# Patient Record
Sex: FEMALE | Race: ASIAN | NOT HISPANIC OR LATINO
[De-identification: names, ages, dates, MRNs, and addresses within clinical notes are randomized per-mention and may not be internally consistent; named-entity substitution may affect disease eponyms.]

---

## 2018-02-01 ENCOUNTER — APPOINTMENT (OUTPATIENT)
Dept: SURGERY | Facility: CLINIC | Age: 43
End: 2018-02-01
Payer: COMMERCIAL

## 2018-02-01 VITALS
HEIGHT: 65 IN | DIASTOLIC BLOOD PRESSURE: 84 MMHG | WEIGHT: 200 LBS | BODY MASS INDEX: 33.32 KG/M2 | TEMPERATURE: 98.6 F | SYSTOLIC BLOOD PRESSURE: 127 MMHG | HEART RATE: 78 BPM

## 2018-02-01 PROCEDURE — 99203 OFFICE O/P NEW LOW 30 MIN: CPT

## 2018-03-01 ENCOUNTER — APPOINTMENT (OUTPATIENT)
Dept: SURGERY | Facility: CLINIC | Age: 43
End: 2018-03-01
Payer: COMMERCIAL

## 2018-03-01 VITALS
BODY MASS INDEX: 34.16 KG/M2 | WEIGHT: 205 LBS | TEMPERATURE: 98.7 F | HEART RATE: 75 BPM | DIASTOLIC BLOOD PRESSURE: 84 MMHG | SYSTOLIC BLOOD PRESSURE: 128 MMHG | HEIGHT: 65 IN

## 2018-03-01 PROCEDURE — 99213 OFFICE O/P EST LOW 20 MIN: CPT

## 2018-03-21 ENCOUNTER — OUTPATIENT (OUTPATIENT)
Dept: OUTPATIENT SERVICES | Facility: HOSPITAL | Age: 43
LOS: 1 days | End: 2018-03-21
Payer: COMMERCIAL

## 2018-03-21 VITALS
OXYGEN SATURATION: 98 % | WEIGHT: 199.96 LBS | RESPIRATION RATE: 18 BRPM | TEMPERATURE: 99 F | DIASTOLIC BLOOD PRESSURE: 80 MMHG | HEIGHT: 65 IN | HEART RATE: 72 BPM | SYSTOLIC BLOOD PRESSURE: 130 MMHG

## 2018-03-21 DIAGNOSIS — Z98.890 OTHER SPECIFIED POSTPROCEDURAL STATES: Chronic | ICD-10-CM

## 2018-03-21 DIAGNOSIS — N63.0 UNSPECIFIED LUMP IN UNSPECIFIED BREAST: ICD-10-CM

## 2018-03-21 DIAGNOSIS — Z01.818 ENCOUNTER FOR OTHER PREPROCEDURAL EXAMINATION: ICD-10-CM

## 2018-03-21 PROCEDURE — G0463: CPT

## 2018-03-21 PROCEDURE — 19125 EXCISION BREAST LESION: CPT | Mod: LT

## 2018-03-21 RX ORDER — SODIUM CHLORIDE 9 MG/ML
3 INJECTION INTRAMUSCULAR; INTRAVENOUS; SUBCUTANEOUS EVERY 8 HOURS
Refills: 0 | Status: DISCONTINUED | OUTPATIENT
Start: 2018-03-28 | End: 2018-04-05

## 2018-03-21 NOTE — H&P PST ADULT - FAMILY HISTORY
Mother  Still living? Yes, Estimated age: Age Unknown  Family history of heart disease, Age at diagnosis: Age Unknown  Family history of hypertension, Age at diagnosis: Age Unknown     Father  Still living? Yes, Estimated age: Age Unknown  Family history of hypertension, Age at diagnosis: Age Unknown  Family history of diabetes mellitus, Age at diagnosis: Age Unknown

## 2018-03-21 NOTE — H&P PST ADULT - NSANTHOSAYNRD_GEN_A_CORE
No. CORBIN screening performed.  STOP BANG Legend: 0-2 = LOW Risk; 3-4 = INTERMEDIATE Risk; 5-8 = HIGH Risk

## 2018-03-21 NOTE — H&P PST ADULT - HISTORY OF PRESENT ILLNESS
This is 41 y/o female a routine mammogram revealed left breast mass, needle biopsy + atypical cells, she presents today for surgery

## 2018-03-27 ENCOUNTER — TRANSCRIPTION ENCOUNTER (OUTPATIENT)
Age: 43
End: 2018-03-27

## 2018-03-28 ENCOUNTER — RESULT REVIEW (OUTPATIENT)
Age: 43
End: 2018-03-28

## 2018-03-28 ENCOUNTER — OUTPATIENT (OUTPATIENT)
Dept: OUTPATIENT SERVICES | Facility: HOSPITAL | Age: 43
LOS: 1 days | Discharge: HOME | End: 2018-03-28
Payer: COMMERCIAL

## 2018-03-28 VITALS
OXYGEN SATURATION: 99 % | HEART RATE: 72 BPM | SYSTOLIC BLOOD PRESSURE: 129 MMHG | TEMPERATURE: 98 F | RESPIRATION RATE: 17 BRPM | HEIGHT: 65 IN | DIASTOLIC BLOOD PRESSURE: 74 MMHG | WEIGHT: 199.96 LBS

## 2018-03-28 VITALS
DIASTOLIC BLOOD PRESSURE: 76 MMHG | HEART RATE: 67 BPM | SYSTOLIC BLOOD PRESSURE: 115 MMHG | RESPIRATION RATE: 12 BRPM | OXYGEN SATURATION: 100 % | TEMPERATURE: 98 F

## 2018-03-28 DIAGNOSIS — N63.0 UNSPECIFIED LUMP IN UNSPECIFIED BREAST: ICD-10-CM

## 2018-03-28 DIAGNOSIS — Z98.890 OTHER SPECIFIED POSTPROCEDURAL STATES: Chronic | ICD-10-CM

## 2018-03-28 DIAGNOSIS — Z01.818 ENCOUNTER FOR OTHER PREPROCEDURAL EXAMINATION: ICD-10-CM

## 2018-03-28 LAB — HCG UR QL: NEGATIVE — SIGNIFICANT CHANGE UP

## 2018-03-28 PROCEDURE — 19125 EXCISION BREAST LESION: CPT | Mod: LT

## 2018-03-28 PROCEDURE — 76098 X-RAY EXAM SURGICAL SPECIMEN: CPT

## 2018-03-28 PROCEDURE — 88305 TISSUE EXAM BY PATHOLOGIST: CPT

## 2018-03-28 PROCEDURE — 81025 URINE PREGNANCY TEST: CPT

## 2018-03-28 PROCEDURE — 76098 X-RAY EXAM SURGICAL SPECIMEN: CPT | Mod: 26

## 2018-03-28 PROCEDURE — 19281 PERQ DEVICE BREAST 1ST IMAG: CPT

## 2018-03-28 PROCEDURE — 88305 TISSUE EXAM BY PATHOLOGIST: CPT | Mod: 26

## 2018-03-28 PROCEDURE — 19281 PERQ DEVICE BREAST 1ST IMAG: CPT | Mod: LT

## 2018-03-28 RX ORDER — ACETAMINOPHEN WITH CODEINE 300MG-30MG
1 TABLET ORAL
Qty: 10 | Refills: 0
Start: 2018-03-28

## 2018-03-28 RX ORDER — ACETAMINOPHEN 500 MG
1000 TABLET ORAL ONCE
Refills: 0 | Status: DISCONTINUED | OUTPATIENT
Start: 2018-03-28 | End: 2018-03-28

## 2018-03-28 RX ORDER — ACETAMINOPHEN WITH CODEINE 300MG-30MG
1 TABLET ORAL EVERY 4 HOURS
Refills: 0 | Status: DISCONTINUED | OUTPATIENT
Start: 2018-03-28 | End: 2018-03-28

## 2018-03-28 RX ORDER — ONDANSETRON 8 MG/1
4 TABLET, FILM COATED ORAL ONCE
Refills: 0 | Status: DISCONTINUED | OUTPATIENT
Start: 2018-03-28 | End: 2018-03-28

## 2018-03-28 RX ORDER — HYDROMORPHONE HYDROCHLORIDE 2 MG/ML
0.5 INJECTION INTRAMUSCULAR; INTRAVENOUS; SUBCUTANEOUS
Refills: 0 | Status: DISCONTINUED | OUTPATIENT
Start: 2018-03-28 | End: 2018-03-28

## 2018-03-28 NOTE — ASU DISCHARGE PLAN (ADULT/PEDIATRIC). - MEDICATION SUMMARY - MEDICATIONS TO TAKE
I will START or STAY ON the medications listed below when I get home from the hospital:    acetaminophen-codeine 300 mg-30 mg oral tablet  -- 1 tab(s) by mouth every 6 hours MDD:4 tabs prn pain  -- Caution federal law prohibits the transfer of this drug to any person other  than the person for whom it was prescribed.  May cause drowsiness.  Alcohol may intensify this effect.  Use care when operating dangerous machinery.  This product contains acetaminophen.  Do not use  with any other product containing acetaminophen to prevent possible liver damage.  Using more of this medication than prescribed may cause serious breathing problems.    -- Indication: For pain

## 2018-03-28 NOTE — BRIEF OPERATIVE NOTE - PROCEDURE
<<-----Click on this checkbox to enter Procedure Excisional biopsy of breast with needle localization  03/28/2018  left  Active  JESSICA

## 2018-03-30 LAB — SURGICAL PATHOLOGY FINAL REPORT - CH: SIGNIFICANT CHANGE UP

## 2018-04-12 PROBLEM — Z87.19 HISTORY OF FATTY INFILTRATION OF LIVER: Status: RESOLVED | Noted: 2018-04-12 | Resolved: 2018-04-12

## 2018-04-12 PROBLEM — Z82.49 FAMILY HISTORY OF HYPERTENSION: Status: ACTIVE | Noted: 2018-02-01

## 2018-04-12 PROBLEM — Z88.9 HISTORY OF SEASONAL ALLERGIES: Status: RESOLVED | Noted: 2018-02-01 | Resolved: 2018-04-12

## 2018-04-12 PROBLEM — Z97.3 WEARS GLASSES: Status: RESOLVED | Noted: 2018-02-01 | Resolved: 2018-04-12

## 2018-04-12 PROBLEM — Z87.898 HISTORY OF LUMP OF LEFT BREAST: Status: RESOLVED | Noted: 2018-04-12 | Resolved: 2018-04-12

## 2018-04-19 ENCOUNTER — APPOINTMENT (OUTPATIENT)
Dept: SURGERY | Facility: CLINIC | Age: 43
End: 2018-04-19
Payer: COMMERCIAL

## 2018-04-19 DIAGNOSIS — Z87.898 PERSONAL HISTORY OF OTHER SPECIFIED CONDITIONS: ICD-10-CM

## 2018-04-19 DIAGNOSIS — Z82.49 FAMILY HISTORY OF ISCHEMIC HEART DISEASE AND OTHER DISEASES OF THE CIRCULATORY SYSTEM: ICD-10-CM

## 2018-04-19 DIAGNOSIS — Z87.19 PERSONAL HISTORY OF OTHER DISEASES OF THE DIGESTIVE SYSTEM: ICD-10-CM

## 2018-04-19 DIAGNOSIS — Z88.9 ALLERGY STATUS TO UNSPECIFIED DRUGS, MEDICAMENTS AND BIOLOGICAL SUBSTANCES: ICD-10-CM

## 2018-04-19 DIAGNOSIS — Z97.3 PRESENCE OF SPECTACLES AND CONTACT LENSES: ICD-10-CM

## 2018-04-19 DIAGNOSIS — Z82.2 FAMILY HISTORY OF DEAFNESS AND HEARING LOSS: ICD-10-CM

## 2018-04-19 DIAGNOSIS — Z87.42 PERSONAL HISTORY OF OTHER DISEASES OF THE FEMALE GENITAL TRACT: ICD-10-CM

## 2018-04-19 PROCEDURE — 99024 POSTOP FOLLOW-UP VISIT: CPT

## 2018-07-26 ENCOUNTER — APPOINTMENT (OUTPATIENT)
Dept: SURGERY | Facility: CLINIC | Age: 43
End: 2018-07-26
Payer: COMMERCIAL

## 2018-07-26 VITALS
SYSTOLIC BLOOD PRESSURE: 137 MMHG | HEIGHT: 65 IN | WEIGHT: 202 LBS | DIASTOLIC BLOOD PRESSURE: 92 MMHG | TEMPERATURE: 98.3 F | HEART RATE: 73 BPM | BODY MASS INDEX: 33.66 KG/M2

## 2018-07-26 PROCEDURE — 99213 OFFICE O/P EST LOW 20 MIN: CPT

## 2018-09-07 NOTE — H&P PST ADULT - GASTROINTESTINAL
seizure precautions/aspiration precautions/fall precautions/vision precautions negative Soft, non-tender, no hepatosplenomegaly, normal bowel sounds

## 2022-07-05 NOTE — ASU PREOP CHECKLIST - ADVANCE DIRECTIVE ADDRESSED/READDRESSED
done Sarecycline Counseling: Patient advised regarding possible photosensitivity and discoloration of the teeth, skin, lips, tongue and gums.  Patient instructed to avoid sunlight, if possible.  When exposed to sunlight, patients should wear protective clothing, sunglasses, and sunscreen.  The patient was instructed to call the office immediately if the following severe adverse effects occur:  hearing changes, easy bruising/bleeding, severe headache, or vision changes.  The patient verbalized understanding of the proper use and possible adverse effects of sarecycline.  All of the patient's questions and concerns were addressed.

## 2022-11-01 PROBLEM — N83.209 UNSPECIFIED OVARIAN CYST, UNSPECIFIED SIDE: Chronic | Status: ACTIVE | Noted: 2018-03-21

## 2022-11-01 PROBLEM — K76.0 FATTY (CHANGE OF) LIVER, NOT ELSEWHERE CLASSIFIED: Chronic | Status: ACTIVE | Noted: 2018-03-21

## 2022-11-28 ENCOUNTER — APPOINTMENT (OUTPATIENT)
Dept: SURGERY | Facility: CLINIC | Age: 47
End: 2022-11-28

## 2022-11-28 VITALS
SYSTOLIC BLOOD PRESSURE: 110 MMHG | TEMPERATURE: 96.4 F | DIASTOLIC BLOOD PRESSURE: 76 MMHG | HEIGHT: 65 IN | BODY MASS INDEX: 34.16 KG/M2 | HEART RATE: 84 BPM | WEIGHT: 205 LBS

## 2022-11-28 DIAGNOSIS — Z80.3 FAMILY HISTORY OF MALIGNANT NEOPLASM OF BREAST: ICD-10-CM

## 2022-11-28 DIAGNOSIS — Z86.79 PERSONAL HISTORY OF OTHER DISEASES OF THE CIRCULATORY SYSTEM: ICD-10-CM

## 2022-11-28 DIAGNOSIS — Z83.3 FAMILY HISTORY OF DIABETES MELLITUS: ICD-10-CM

## 2022-11-28 DIAGNOSIS — Z82.3 FAMILY HISTORY OF STROKE: ICD-10-CM

## 2022-11-28 DIAGNOSIS — N60.09 SOLITARY CYST OF UNSPECIFIED BREAST: ICD-10-CM

## 2022-11-28 PROCEDURE — 99203 OFFICE O/P NEW LOW 30 MIN: CPT

## 2022-11-28 RX ORDER — BISACODYL 5 MG/1
5 TABLET ORAL
Qty: 4 | Refills: 0 | Status: ACTIVE | COMMUNITY
Start: 2022-10-25

## 2022-11-28 RX ORDER — METHYLPREDNISOLONE 4 MG/1
4 TABLET ORAL
Qty: 21 | Refills: 0 | Status: ACTIVE | COMMUNITY
Start: 2022-08-27

## 2022-11-28 RX ORDER — LOSARTAN POTASSIUM 50 MG/1
50 TABLET, FILM COATED ORAL
Qty: 30 | Refills: 0 | Status: ACTIVE | COMMUNITY
Start: 2022-11-23

## 2022-11-28 RX ORDER — FAMOTIDINE 40 MG/1
40 TABLET, FILM COATED ORAL
Qty: 60 | Refills: 0 | Status: ACTIVE | COMMUNITY
Start: 2022-11-23

## 2022-11-28 NOTE — PLAN
[FreeTextEntry1] : Ms. ZALDIVAR  is presenting  today for an evaluation .  she is doing well and offers no complaints.  Results of  her recent  imaging and physical examination findings were discussed in details.   She  was advised to have BL             breast US and Mammogram in  October 2023 and return after the tests. Importance of monthly self-breast examination was reinforced.  Patient's questions and concerns addressed to patient's satisfaction.\par \par  n

## 2022-11-28 NOTE — CONSULT LETTER
[Dear  ___] : Dear  [unfilled], [Consult Letter:] : I had the pleasure of evaluating your patient, [unfilled]. [Please see my note below.] : Please see my note below. [Consult Closing:] : Thank you very much for allowing me to participate in the care of this patient.  If you have any questions, please do not hesitate to contact me. [Sincerely,] : Sincerely, [FreeTextEntry3] : Charles Conn MD, FACS

## 2022-11-28 NOTE — PHYSICAL EXAM
[Alert] : alert [Oriented to Person] : oriented to person [Oriented to Place] : oriented to place [Oriented to Time] : oriented to time [Calm] : calm [de-identified] : She  is alert, well-groomed, and in NAD [de-identified] : anicteric.  Nasal mucosa pink, septum midline. Oral mucosa pink.  Tongue midline, Pharynx without exudates.\par   [de-identified] : Neck supple. Trachea midline. Thyroid isthmus barely palpable, lobes not felt.\par   [de-identified] : No chest deformity. Breast are symmetric, Normal contours. No nodules, masses, tenderness, or axillary or supraclavicular  adenopathy. No nipple discharge. no skin retraction \par

## 2022-11-28 NOTE — DATA REVIEWED
[FreeTextEntry1] : Patient Name\par ROCHELLE ZALDIVAR\par Date of Birth\par 1975\par Procedure\par US SCREENING BREAST BILATERAL\par Study Date & Time\par 2022-10-18 6:31 PM\par Patient ID\par [id number]\par Accession Number\par [id number]\par Referring Physician\par FAUZIA JONES\par Institution Name\par [facility]\par Report\par RADIOLOGY REPORT\par FINDINGS\par FINDING\par Indication: 47 years old for screening. Reported history of benign left core needle biopsy in 2017.\par There is no family history of breast cancer.\par Date of last clinical exam:"One month ago".\par Comparison is made with multiple prior mammograms dating back to 12/15/2016 and prior breast\par ultrasounds dated 10/16/2021 and 10/12/2020.\par 3D digital CC and MLO views of both breasts were obtained. Reconstructed CC and MLO views were\par obtained. This study was interpreted in conjunction with a computer aided detection system.\par Findings:\par The breast tissue is heterogeneously dense, which may obscure small masses. There are two biopsy\par clips in the left breast. Bilateral scattered coarse benign-appearing calcifications are unchanged.\par No dominant masses, significant calcifications or areas of architectural distortion are identified.\par Nonenlarged bilateral axillary lymph nodes are seen.\par Bilateral breast ultrasound was performed around the clock including the retroareolar regions and\par axillae using a high resolution linear array transducer.\par Right breast:\par Scattered simple cysts, the largest of which is located at 9:00, 5 cm from the nipple and measures\par 0.8 cm.\par Left breast:\par 2:00, stable area of postsurgical scar.\par Scattered simple cysts, the largest of which is located at 2:00, 4 cm from the nipple and measures\par 1.1 cm.\par There are no enlarged axillary lymph nodes on either side.\par Impression:\par No mammographic or sonographic evidence of malignancy.\par BI-RADS CATEGORY: 2 - Benign\par Recommendation: Routine annual follow up.\par A letter will be sent to the patient with the above recommendations.\par Approximately 10% of breast carcinomas are not radiographically detectable. A negative report should\par not delay biopsy if a clinically suspicious mass is present. Dense breasts may obscure an underlying\par neoplasm.\par Patient has been added into a reminder system with a target due date for their next mammogram.\par BIRADS 1: Negative\par BIRADS 2: Benign\par BIRADS 3: Probably Benign\par BIRADS 4: Suspicious Abnormality - Biopsy should be considered.\par BIRADS 4a: Low Suspicion of Malignancy\par BIRADS 4b: Intermediate Suspicion of Malignancy\par BIRADS 4c: Moderately Suspicious of Malignancy\par BIRADS 5: Highly Suspicious of Malignancy\par BIRADS 6: Known Malignancy\par BIRADS 0: Incomplete - Need Additional Imaging Evaluation and/or Prior Mammograms for Comparison\par The [place] Department of Health requires us to indicate the date of the patient's last\par clinical breast examination.\par Electronically signed by: Shelly Garcia MD 10/19/2022 2:10 PM\par Workstation: [id number]\par Electronically Signed By: Shelly Garcia MD\par Sign Date: 19-OCT-22[facility] Rad[facility]

## 2022-11-28 NOTE — HISTORY OF PRESENT ILLNESS
[de-identified] : Patient is a 47 year   -old female  who was referred by Dr. Kalyn Champion with cc of having breast cysts.  She  denies any trauma and She has no nipple discharge.  No breast pain. She  denies any fever, night sweats or loss of appetite.    There is  family history of breast carcinoma in paternal Aunt .   Menarche at age 13 -1 para-1 and her last menstrual period was in 2022. Patient is on no hormonal replacement therapy.    Patient  had a BL breast US and a  mammogram on 10/18/2022 that was deemed a BIRADS 2 . Ms. ZALDIVAR  is s/p excision left breast mass by me in 2018. Benign

## 2024-02-07 NOTE — HISTORY OF PRESENT ILLNESS
[de-identified] : This is  a 48 year   old patient presenting today for a breast exam and to discuss the results of her recent  breast imaging. Patient had a BL breast US and   BL breast Mammogram on 2023.  Deemed BIRADS category 2.     PERTINENT HISTORY:   There is family history of breast carcinoma in paternal Aunt. Menarche at age 13 -1 para-1  . Patient is on no hormonal replacement therapy.

## 2024-02-07 NOTE — DATA REVIEWED
[FreeTextEntry1] : Patient Name ROCHELLE ZALDIVAR Date of Birth 1975 Procedure MA 3D DIGITAL SCREENING MAMMOGRAPHY Study Date & Time 2023-12-13 3:00 PM Patient ID [id number] Accession Number [id number] Referring Physician FAUZIA JONES Institution Name [id number] Report RADIOLOGY REPORT FINDINGS FINDING Indication: Patient is 48 years old and is seen for screening. The patient has a history of left core needle biopsy in 2017 - benign. The patient has no personal history of cancer. The patient has the following family history of breast cancer: paternal aunt, at age 80, breast cancer. Date of last clinical exam: Within the past year Breast Cancer Risk: NCI 5 year:1.4% NCI Lifetime:12.8% Comparison: Multiple prior mammograms dating back to 12/15/2016 and prior breast ultrasounds dated 10/18/2022 and 10/16/2021. 3D digital CC and MLO views of both breasts were obtained with reconstructed views. This study was interpreted in conjunction with a computer aided detection system. Findings: The breast tissue is heterogeneously dense, which may obscure small masses. There are two biopsy markers in the left breast. Bilateral scattered coarse benign-appearing calcifications are unchanged. No dominant masses, significant calcifications or areas of architectural distortion are identified. Nonenlarged bilateral axillary lymph nodes are seen. Bilateral breast ultrasound was performed around the clock including the retroareolar regions and axillae using a high resolution linear array transducer. Right breast: Scattered simple cysts, the largest of which is located at 9:00, 5 cm from the nipple and measures 0.7 cm. Left breast: 2/7/24, 12:33 PM [employer] [email or web address] Scattered simple cysts, the largest of which is located at 1:00, 3 cm from the nipple and measures 1.5 cm. There are no enlarged axillary lymph nodes on either side. Impression: No mammographic or sonographic evidence for malignancy. BI-RADS CATEGORY: 2 - Benign Recommendation: Routine annual follow up. A letter will be sent to the patient with the above recommendations. Approximately 10% of breast carcinomas are not radiographically detectable. A negative report should not delay biopsy if a clinically suspicious mass is present. Dense breasts may obscure an underlying neoplasm. Patient has been added into a reminder system with a target due date for their next mammogram. BIRADS 1: Negative BIRADS 2: Benign BIRADS 3: Probably Benign BIRADS 4: Suspicious Abnormality - Biopsy should be considered. BIRADS 4a: Low Suspicion of Malignancy BIRADS 4b: Intermediate Suspicion of Malignancy BIRADS 4c: Moderately Suspicious of Malignancy BIRADS 5: Highly Suspicious of Malignancy BIRADS 6: Known Malignancy BIRADS 0: Incomplete - Need Additional Imaging Evaluation and/or Prior Mammograms for Comparison The [place] Department of Health requires us to indicate the date of the patient's last clinical breast examination. Electronically signed by: Shelly Garcia MD 12/14/2023 9:47 AM Workstation: [id number] Electronically Signed By: Shelly Garcia MD Sign Date: 14-DEC-23 [facility] Radiology

## 2024-03-04 ENCOUNTER — APPOINTMENT (OUTPATIENT)
Dept: SURGERY | Facility: CLINIC | Age: 49
End: 2024-03-04